# Patient Record
Sex: FEMALE | Race: BLACK OR AFRICAN AMERICAN | Employment: FULL TIME | URBAN - METROPOLITAN AREA
[De-identification: names, ages, dates, MRNs, and addresses within clinical notes are randomized per-mention and may not be internally consistent; named-entity substitution may affect disease eponyms.]

---

## 2018-12-06 ENCOUNTER — HOSPITAL ENCOUNTER (EMERGENCY)
Age: 43
Discharge: HOME | End: 2018-12-06
Attending: EMERGENCY MEDICINE
Payer: COMMERCIAL

## 2018-12-06 VITALS
HEART RATE: 80 BPM | DIASTOLIC BLOOD PRESSURE: 101 MMHG | TEMPERATURE: 97.5 F | OXYGEN SATURATION: 100 % | SYSTOLIC BLOOD PRESSURE: 152 MMHG | HEIGHT: 61 IN | RESPIRATION RATE: 18 BRPM | WEIGHT: 173.94 LBS | BODY MASS INDEX: 32.84 KG/M2

## 2018-12-06 DIAGNOSIS — H57.89 ITCHY, WATERY, AND RED EYE: ICD-10-CM

## 2018-12-06 DIAGNOSIS — R03.0 ELEVATED BLOOD PRESSURE READING: ICD-10-CM

## 2018-12-06 DIAGNOSIS — H10.13 ALLERGIC CONJUNCTIVITIS OF BOTH EYES: Primary | ICD-10-CM

## 2018-12-06 DIAGNOSIS — B30.9 ACUTE VIRAL CONJUNCTIVITIS OF BOTH EYES: ICD-10-CM

## 2018-12-06 PROCEDURE — 99282 EMERGENCY DEPT VISIT SF MDM: CPT

## 2018-12-06 RX ORDER — POLYMYXIN B SULFATE AND TRIMETHOPRIM 1; 10000 MG/ML; [USP'U]/ML
1 SOLUTION OPHTHALMIC EVERY 4 HOURS
Qty: 10 ML | Refills: 0 | Status: SHIPPED | OUTPATIENT
Start: 2018-12-06 | End: 2018-12-13

## 2018-12-06 NOTE — ED PROVIDER NOTES
EMERGENCY DEPARTMENT HISTORY AND PHYSICAL EXAM 
 
 
Date: 12/6/2018 Patient Name: Lazara Boyle History of Presenting Illness Chief Complaint Patient presents with  Red Eye  
  bilateral eye redness, pain and itchy after taking her cat to the vet. last dose of benadryl yesterday History Provided By: Patient HPI: Lazara Boyle, 37 y.o. female with PMHx significant for seizures, anxiety, presents ambulatory to the ED with cc of bilateral eye redness and itching that onset last night. She reports sneezing and sore throat. Pt endorses taking PO Benadryl and using eye drops with no alleviation of sxs. Pt notes that she took her cat to the vet yesterday but she is not allergic to cats. She reports she works for the [employer] but had the day off yesterday. She denies chance of foreign body hitting her eyes. She denies recent sick contact. Additionally, pt specifically denies any recent fever, chills, headache, nausea, vomiting, diarrhea, abdominal pain, CP, SOB, lightheadedness, dizziness, numbness, weakness, tingling, BLE swelling, heart palpitations, urinary sxs, changes in BM, changes in PO intake, melena, hematochezia, cough, or congestion. There are no other complaints, changes or physical findings at this time. Past History Past Medical History: 
Past Medical History:  
Diagnosis Date  Neurological disorder   
 seizures  Psychiatric disorder   
 anxiety  Stroke ([facility] [address] Past Surgical History: 
Past Surgical History:  
Procedure Laterality Date  ABDOMEN SURGERY PROC UNLISTED    
 fibroids  HX GYN    
 [id number] Family History: No family history on file. Social History: 
Social History Tobacco Use  Smoking status: Never Smoker  Smokeless tobacco: Never Used Substance Use Topics  Alcohol use: Yes Comment: socially  Drug use: No  
 
 
Allergies: 
No Known Allergies Review of Systems Review of Systems Constitutional: Negative. Negative for chills and fever. HENT: Positive for sneezing and sore throat. Negative for congestion, facial swelling, rhinorrhea, trouble swallowing and voice change. Eyes: Positive for redness (bilateral) and itching (bilateral). Respiratory: Negative. Negative for apnea, cough, chest tightness, shortness of breath and wheezing. Cardiovascular: Negative. Negative for chest pain, palpitations and leg swelling. Gastrointestinal: Negative. Negative for abdominal distention, abdominal pain, blood in stool, constipation, diarrhea, nausea and vomiting. Endocrine: Negative. Negative for cold intolerance, heat intolerance and polyuria. Genitourinary: Negative. Negative for difficulty urinating, dysuria, flank pain, frequency, hematuria and urgency. Musculoskeletal: Negative. Negative for arthralgias, back pain, myalgias, neck pain and neck stiffness. Skin: Negative. Negative for color change and rash. Neurological: Negative. Negative for dizziness, syncope, facial asymmetry, speech difficulty, weakness, light-headedness, numbness and headaches. Hematological: Negative. Does not bruise/bleed easily. Psychiatric/Behavioral: Negative. Negative for confusion and self-injury. The patient is not nervous/anxious. Physical Exam  
Physical Exam  
Constitutional: She is oriented to person, place, and time. She appears well-developed and well-nourished. No distress. HENT:  
Head: Normocephalic and atraumatic. Mouth/Throat: Oropharynx is clear and moist. No oropharyngeal exudate. Eyes: EOM are normal. Pupils are equal, round, and reactive to light. Right conjunctiva is injected. Left conjunctiva is injected. Neck: Normal range of motion. Cardiovascular: Normal rate, regular rhythm and normal heart sounds. Exam reveals no gallop and no friction rub. No murmur heard.  
Pulmonary/Chest: Effort normal and breath sounds normal. No respiratory distress. She has no wheezes. She has no rales. She exhibits no tenderness. Abdominal: Soft. Bowel sounds are normal. She exhibits no distension and no mass. There is no tenderness. There is no rebound and no guarding. Musculoskeletal: Normal range of motion. She exhibits no edema, tenderness or deformity. Neurological: She is alert and oriented to person, place, and time. She displays normal reflexes. No cranial nerve deficit. She exhibits normal muscle tone. Coordination normal.  
Skin: Skin is warm. No rash noted. She is not diaphoretic. Psychiatric: She has a normal mood and affect. Nursing note and vitals reviewed. Medical Decision Making I am the first provider for this patient. I reviewed the vital signs, available nursing notes, past medical history, past surgical history, family history and social history. Vital Signs-Reviewed the patient's vital signs. Patient Vitals for the past 24 hrs: 
 Temp Pulse Resp BP SpO2  
12/06/18 0836 97.5 °F (36.4 °C) 80 18 (!) 152/101 100 % Records Reviewed: Nursing Notes, Old Medical Records, Previous electrocardiograms, Previous Radiology Studies and Previous Laboratory Studies Provider Notes (Medical Decision Making): Pt presents to ED with acute eye redness and itching. Differential: corneal vs scleral abrasion, corneal ulcer; foreign body, conjunctivitis. No red flags for acute glaucoma or globe injury, retinal artery or vein occlusion. Will provide topical eye drops, advised contact precautions and referral to PCP. ED Course:  
Initial assessment performed. The patients presenting problems have been discussed, and they are in agreement with the care plan formulated and outlined with them. I have encouraged them to ask questions as they arise throughout their visit.  
 
I reviewed our electronic medical record system for any past medical records that were available that may contribute to the patient's current condition, the nursing notes and vital signs from today's visit. Neyda Barahona MD 
Progress Note: 
9:21 AM 
Patient has been reassessed and reports feeling better and symptoms have improved after ED treatment. Additionally, pt is able to tolerate PO and ambulate per baseline. Mauricio Merino final labs and imaging have been reviewed with her. She has been counseled regarding her diagnosis. She verbally conveys understanding and agreement of the signs, symptoms, diagnosis, treatment and prognosis and additionally agrees to follow up as recommended with Dr. Ananya Mancilla MD in 24 - 48 hours. She also agrees with the care-plan and conveys that all of her questions have been answered. I have also put together some discharge instructions for her that include: 1) educational information regarding their diagnosis, 2) how to care for their diagnosis at home, as well a 3) list of reasons why they would want to return to the ED prior to their follow-up appointment, should their condition change. I have answered all questions to patient's satisfaction. She both understood and agreed with plan as discussed above. Vital signs stable for discharge. Disposition: 
Discharge Note: 
9:22 AM 
The pt is ready for discharge. The pt's signs, symptoms, diagnosis, and discharge instructions have been discussed and pt has conveyed their understanding. The pt is to follow up as recommended or return to ER should their symptoms worsen. Plan has been discussed and pt is in agreement. PLAN: 
1. Current Discharge Medication List  
  
START taking these medications Details  
trimethoprim-polymyxin b (POLYTRIM) ophthalmic solution Administer 1 Drop to both eyes every four (4) hours for 7 days. Qty: 10 mL, Refills: 0  
  
cetirizine (ZYRTEC) 10 mg TbDi Take 1 Tab by mouth daily. Qty: 20 Tab, Refills: 0  
  
  
 
2. Follow-up Information Follow up With Specialties Details Why Contact Info Chasity Damico MD Family Practice   [address] 
[phone] [facility] EMERGENCY DEPT Emergency Medicine  As needed, If symptoms worsen [address] [facility] 
[phone] Return to ED if worse Diagnosis Clinical Impression: 1. Allergic conjunctivitis of both eyes 2. Acute viral conjunctivitis of both eyes 3. Itchy, watery, and red eye 4. Elevated blood pressure reading Attestations This note is prepared by Seun Velasquez, acting as Scribe for MD Evita Tripp MD : The scribe's documentation has been prepared under my direction and personally reviewed by me in its entirety. I confirm that the note above accurately reflects all work, treatment, procedures, and medical decision making performed by me. 
 
: 
This note will not be viewable in [address] Leanna Wray

## 2018-12-06 NOTE — LETTER
[facility] EMER[facility]ENCY [facility] 
[address] [id number] 
[phone] Work/School Note Date: 12/6/2018 To Whom It May concern: 
 
Jessa Mane was seen and treated today in the emergency room by the following provider(s): 
Attending Provider: Santos Malin MD.   
 
Jessa Mane may return to work on 12/7/18. Sincerely, Wing Escobar MD

## 2018-12-06 NOTE — DISCHARGE INSTRUCTIONS
Thank you for allowing us to take care of you today! We hope we addressed all of your concerns and needs. We strive to provide excellent quality care in the Emergency Department. You will receive a survey after your visit to evaluate the care you were provided. Should you receive a survey from us, we invite you to share your experience and tell us what made it excellent. It was a pleasure serving you, we invite you to share your experience with us, in our pursuit for excellence, should you be selected to receive a survey. The exam and treatment you received in the Emergency Department were for an urgent problem and are not intended as complete care. It is important that you follow up with a doctor, nurse practitioner, or physician assistant for ongoing care. If your symptoms become worse or you do not improve as expected and you are unable to reach your usual health care provider, you should return to the Emergency Department. We are available 24 hours a day. Please take your discharge instructions with you when you go to your follow-up appointment. If you have any problem arranging a follow-up appointment, contact the Emergency Department immediately. If a prescription has been provided, please have it filled as soon as possible to prevent a delay in treatment. Read the entire medication instruction sheet provided to you by the pharmacy. If you have any questions or reservations about taking the medication due to side effects or interactions with other medications, please call your primary care physician or contact the ER to speak with the charge nurse. Make an appointment with your family doctor or the physician you were referred to for follow-up of this visit as instructed on your discharge paperwork, as this is mandatory follow-up. Return to the ER if you are unable to be seen or if you are unable to be seen in a timely manner.     If you have any problem arranging the follow-up visit, contact the Emergency Department immediately. I hope you feel better and thank you again for allow us to provide you with excellent care today at [address]! Warmest regards,    Shivani Chinchilla MD  Emergency Medicine Physician  [address]                   Pinkeye: Care Instructions  Your Care Instructions    Pinkeye is redness and swelling of the eye surface and the conjunctiva (the lining of the eyelid and the covering of the white part of the eye). Pinkeye is also called conjunctivitis. Pinkeye is often caused by infection with bacteria or a virus. Dry air, allergies, smoke, and chemicals are other common causes. Pinkeye often clears on its own in 7 to 10 days. Antibiotics only help if the pinkeye is caused by bacteria. Pinkeye caused by infection spreads easily. If an allergy or chemical is causing pinkeye, it will not go away unless you can avoid whatever is causing it. Follow-up care is a key part of your treatment and safety. Be sure to make and go to all appointments, and call your doctor if you are having problems. It's also a good idea to know your test results and keep a list of the medicines you take. How can you care for yourself at home? · Wash your hands often. Always wash them before and after you treat pinkeye or touch your eyes or face. · Use moist cotton or a clean, wet cloth to remove crust. Wipe from the inside corner of the eye to the outside. Use a clean part of the cloth for each wipe. · Put cold or warm wet cloths on your eye a few times a day if the eye hurts. · Do not wear contact lenses or eye makeup until the pinkeye is gone. Throw away any eye makeup you were using when you got pinkeye. Clean your contacts and storage case. If you wear disposable contacts, use a new pair when your eye has cleared and it is safe to wear contacts again.   · If the doctor gave you antibiotic ointment or eyedrops, use them as directed. Use the medicine for as long as instructed, even if your eye starts looking better soon. Keep the bottle tip clean, and do not let it touch the eye area. · To put in eyedrops or ointment:  ? Tilt your head back, and pull your lower eyelid down with one finger. ? Drop or squirt the medicine inside the lower lid. ? Close your eye for 30 to 60 seconds to let the drops or ointment move around. ? Do not touch the ointment or dropper tip to your eyelashes or any other surface. · Do not share towels, pillows, or washcloths while you have pinkeye. When should you call for help? Call your doctor now or seek immediate medical care if:    · You have pain in your eye, not just irritation on the surface.     · You have a change in vision or loss of vision.     · You have an increase in discharge from the eye.     · Your eye has not started to improve or begins to get worse within 48 hours after you start using antibiotics.     · Pinkeye lasts longer than 7 days.    Watch closely for changes in your health, and be sure to contact your doctor if you have any problems. Where can you learn more? Go to http://shivani-chip.info/. Enter Y392 in the search box to learn more about \"Pinkeye: Care Instructions. \"  Current as of: November 20, 2017  Content Version: 11.8  © 2640-0063 [employer]. Care instructions adapted under license by [employer] (which disclaims liability or warranty for this information). If you have questions about a medical condition or this instruction, always ask your healthcare professional. [place] [address] any warranty or liability for your use of this information.

## 2021-05-17 ENCOUNTER — TELEPHONE (OUTPATIENT)
Dept: FAMILY MEDICINE CLINIC | Age: 46
End: 2021-05-17

## 2021-05-17 NOTE — TELEPHONE ENCOUNTER
----- Message from Ling Douglass sent at 5/17/2021  1:14 PM EDT -----  Regarding: [place]/Telephone  Contact: [phone]  Appointment not available    Caller's first and last name and relationship to patient (if not the patient): N/A      Best contact number:[phone] or [phone]      Preferred date and time: First available, anytime      Scheduled appointment date and time: August 5 at 8:30 am.      Reason for appointment: New patient appointment. Details to clarify the request: Patient would like to be seen sooner.         Ling Douglass

## 2021-05-18 NOTE — TELEPHONE ENCOUNTER
Verified patient with two type of identifiers. Informed pt at this time this would be the soonest with NP jeovanny but offered sooner appt with Dr. Bernadette Quispe. Pt states would like to cancel appt that was scheduled in August and will call back to reschedule.

## 2021-06-17 ENCOUNTER — OFFICE VISIT (OUTPATIENT)
Dept: NEUROLOGY | Age: 46
End: 2021-06-17
Payer: COMMERCIAL

## 2021-06-17 VITALS
SYSTOLIC BLOOD PRESSURE: 130 MMHG | BODY MASS INDEX: 32.8 KG/M2 | OXYGEN SATURATION: 98 % | DIASTOLIC BLOOD PRESSURE: 90 MMHG | WEIGHT: 173.6 LBS | TEMPERATURE: 98.8 F | RESPIRATION RATE: 18 BRPM | HEART RATE: 89 BPM

## 2021-06-17 DIAGNOSIS — G40.909 SEIZURE DISORDER (HCC): ICD-10-CM

## 2021-06-17 DIAGNOSIS — I67.82 CEREBRAL ISCHEMIA: Primary | ICD-10-CM

## 2021-06-17 DIAGNOSIS — G43.009 MIGRAINE WITHOUT AURA AND WITHOUT STATUS MIGRAINOSUS, NOT INTRACTABLE: ICD-10-CM

## 2021-06-17 PROCEDURE — 99205 OFFICE O/P NEW HI 60 MIN: CPT | Performed by: PSYCHIATRY & NEUROLOGY

## 2021-06-17 RX ORDER — CALCIUM CARBONATE/VITAMIN D3 600 MG-125
1 TABLET ORAL DAILY
COMMUNITY

## 2021-06-17 RX ORDER — GUAIFENESIN 100 MG/5ML
81 LIQUID (ML) ORAL DAILY
COMMUNITY

## 2021-06-17 RX ORDER — LANOLIN ALCOHOL/MO/W.PET/CERES
325 CREAM (GRAM) TOPICAL EVERY OTHER DAY
COMMUNITY

## 2021-06-17 RX ORDER — BISMUTH SUBSALICYLATE 262 MG
1 TABLET,CHEWABLE ORAL DAILY
COMMUNITY

## 2021-06-17 NOTE — PROGRESS NOTES
Neurology Consult Note      HISTORY PROVIDED BY: patient    Chief Complaint:   Chief Complaint   Patient presents with    New Patient      Subjective:    Maria De Jesus Harrison is a 39 y.o. right handed female who presents in consultation for seizure and headache. This is a 42-year-old right-handed female with history of seizure disorder, major depressive disorder, cerebrovascular accident, who was referred to the clinic to establish neurological care and management. Patient says she was told that she has stroke in October 2001, patient was then managed, subsequently in June 2002 patient had seizure activity. Since then, patient says she has had couple of seizures, seizure would come without warning and she would lose consciousness. Patient last seizure however was 15 years ago. Patient says she currently having headaches, headache frequency is variable 1-2 times a week. Headache is throbbing in nature mostly frontal, occasional sharp pain come from the back of the head. Headache associated with dizziness, blurry vision, occasional nausea, photophobia, phonophobia. There is no established trigger, however, stress and lack of sleep make the headache worse. Patient says sometimes she has to go to a dark and quiet room to make the headache better. Sometimes, patient says she uses over-the-counter medication.   Review of Systems - General ROS: positive for  - fatigue and sleep disturbance  Psychological ROS: positive for - anxiety, depression and sleep disturbances  Ophthalmic ROS: positive for - blurry vision, decreased vision and photophobia  ENT ROS: positive for - headaches, tinnitus and vertigo  Allergy and Immunology ROS: negative  Hematological and Lymphatic ROS: negative  Endocrine ROS: negative  Respiratory ROS: no cough, shortness of breath, or wheezing  Cardiovascular ROS: no chest pain or dyspnea on exertion  Gastrointestinal ROS: no abdominal pain, change in bowel habits, or black or bloody stools  Genito-Urinary ROS: no dysuria, trouble voiding, or hematuria  Musculoskeletal ROS: positive for - joint pain, muscle pain and muscular weakness  Neurological ROS: positive for - dizziness, headaches, impaired coordination/balance, numbness/tingling, seizures, visual changes and weakness  Dermatological ROS: negative    Past Medical History:   Diagnosis Date    Neurological disorder     seizures    Psychiatric disorder     anxiety    Stroke [facility])       Past Surgical History:   Procedure Laterality Date    ABDOMEN SURGERY PROC UNLISTED      fibroids    HX GYN      [id number]      Social History     Socioeconomic History    Marital status: SINGLE     Spouse name: Not on file    Number of children: Not on file    Years of education: Not on file    Highest education level: Not on file   Occupational History    Not on file   Tobacco Use    Smoking status: Never Smoker    Smokeless tobacco: Never Used   Substance and Sexual Activity    Alcohol use: Yes     Comment: socially    Drug use: No    Sexual activity: Yes     Birth control/protection: Implant   Other Topics Concern    Not on file   Social History Narrative    Not on file     Social Determinants of Health     Financial Resource Strain:     Difficulty of Paying Living Expenses:    Food Insecurity:     Worried About Running Out of Food in the Last Year:     Ran Out of Food in the Last Year:    Transportation Needs:     Lack of Transportation (Medical):      Lack of Transportation (Non-Medical):    Physical Activity:     Days of Exercise per Week:     Minutes of Exercise per Session:    Stress:     Feeling of Stress :    Social Connections:     Frequency of Communication with Friends and Family:     Frequency of Social Gatherings with Friends and Family:     Attends [employer] Services:     Active Member of Clubs or Organizations:     Attends Club or Organization Meetings:     Marital Status:    Intimate Partner Violence:     Fear of Current or Ex-Partner:     Emotionally Abused:     Physically Abused:     Sexually Abused:      No family history on file. Objective:   ROS  As per HPI    No Known Allergies     Meds:  Outpatient Medications Prior to Visit   Medication Sig Dispense Refill    aspirin 81 mg chewable tablet Take 81 mg by mouth daily.  multivitamin (ONE A DAY) tablet Take 1 Tablet by mouth daily.  calcium-cholecalciferol, d3, (CALCIUM 600 + D) 600-125 mg-unit tab Take 1 Capsule by mouth daily.  ferrous sulfate (Iron) 325 mg (65 mg iron) tablet Take 325 mg by mouth every other day.  cetirizine (ZYRTEC) 10 mg TbDi Take 1 Tab by mouth daily. 20 Tab 0    phenytoin ER (DILANTIN EXTENDED) 100 mg ER capsule Take 500 mg by mouth daily. No facility-administered medications prior to visit. Imaging:  MRI Results (most recent):  No results found for this or any previous visit. CT Results (most recent):  Results from Hospital Encounter encounter on 01/05/13    CT HEAD WO CONT    Narrative  **Final Report**      ICD Codes / Adm. Diagnosis: 52   / Headache  Examination:  CT HEAD WO CON  - [id number] - Jan 5 2013  1:32PM  Accession No:  [id number]  Reason:  headache      REPORT:  INDICATION:   headache    EXAM:  HEAD CT WITHOUT CONTRAST    COMPARISON:  None    TECHNIQUE:  Routine noncontrast axial head CT was performed. FINDINGS:    The ventricles are midline without hydrocephalus. There is no acute intra  or extra-axial hemorrhage. There is no acute infarct. There is no mass on  this unenhanced examination. There is encephalomalacia in the right middle  cerebral artery territory. The basal cisterns are patent. The paranasal  sinuses are clear. IMPRESSION:    Remote right MCA territory infarction. No acute abnormality.               Signing/Reading Doctor: Kalin Lacey ([id number])  Approved: Kalin Lacey ([id number])  Jan 5 2013  1:54PM       Reviewed records in [employer] and [facility] tab today    Lab Review   Results for orders placed or performed in visit on 06/17/21   VITAMIN B12   Result Value Ref Range    Vitamin B12 338 232 - 1,245 pg/mL   SPEP AND ROSLYN, SERUM   Result Value Ref Range    Immunoglobulin G, Qt. 1,209 586 - 1,602 mg/dL    Immunoglobulin A, Qt. 253 87 - 352 mg/dL    Immunoglobulin M, Qt. 145 26 - 217 mg/dL    Protein, total 7.5 6.0 - 8.5 g/dL    Albumin 4.0 2.9 - 4.4 g/dL    Alpha-1-Globulin 0.3 0.0 - 0.4 g/dL    Alpha-2-Globulin 0.9 0.4 - 1.0 g/dL    Beta Globulin 1.2 0.7 - 1.3 g/dL    Gamma Globulin 1.2 0.4 - 1.8 g/dL    M-Rolan Not Observed Not Observed g/dL    Globulin, total 3.5 2.2 - 3.9 g/dL    A/G ratio 1.2 0.7 - 1.7    Immunofixation Result Comment     Please note Comment    SED RATE (ESR)   Result Value Ref Range    Sed rate (ESR) 35 (H) 0 - 32 mm/hr   CRP, HIGH SENSITIVITY   Result Value Ref Range    C-Reactive Protein, Cardiac 8.18 (H) 0.00 - 3.00 mg/L   CK   Result Value Ref Range    Creatine Kinase,Total 74 32 - 182 U/L   ALDOLASE   Result Value Ref Range    Aldolase 3.6 3.3 - 10.3 U/L   PROTEIN ELECTROPHORESIS   Result Value Ref Range    Albumin CANCELED g/dL    Alpha-1-globulin CANCELED g/dL    ALPHA-2 GLOBULIN CANCELED g/dL    Beta globulin CANCELED g/dL    Gamma globulin CANCELED g/dL    M-Rolan CANCELED g/dL    Globulin, total CANCELED g/dL    A/G ratio CANCELED     Please note Comment    PROTEIN C ACTIVITY   Result Value Ref Range    Protein C-Functional 121 73 - 180 %        Exam:  Visit Vitals  BP (!) 130/90   Pulse 89   Temp 98.8 °F (37.1 °C)   Resp 18   Wt 173 lb 9.6 oz (78.7 kg)   SpO2 98%   BMI 32.80 kg/m²     General:  Alert, cooperative, no distress. Head:  Normocephalic, without obvious abnormality, atraumatic. Respiratory:  Heart:   Non labored breathing  Regular rate and rhythm, no murmurs   Neck:   2+ carotids, no bruits   Extremities: Warm, no cyanosis or edema. Pulses: 2+ radial pulses.        Neurologic:  MS: Alert and oriented x 4, speech intact. Language intact, able to name, repeat, and follow all commands. Attention and fund of knowledge appropriate. Recent and remote memory intact. Cranial Nerves:  II: visual fields Full to confrontation   II: pupils Equal, round, reactive to light   II: optic disc No papilledema   III,VII: ptosis none   III,IV,VI: extraocular muscles  EOMI, no nystagmus or diplopia   V: facial light touch sensation  normal   VII: facial muscle function   symmetric   VIII: hearing intact   IX: soft palate elevation  normal   XI: trapezius strength  5/5   XI: sternocleidomastoid strength 5/5   XII: tongue  Midline     Motor: normal bulk and tone, no tremor              Strength: 5-/5 throughout, no PD  Sensory: Equivocal sensation to LT, PP, temperature and vibration  Coordination: FTN and HTS intact, MICHAEL intact,Romberg negative  Gait: normal gait, unable to heel, toe, and tandem walk  Reflexes: 3+ symmetric, toes downgoing           Assessment/Plan       ICD-10-CM ICD-9-CM    1. Cerebral ischemia  I67.82 437.1 MRI BRAIN W WO CONT      VITAMIN B12      SPEP AND ROSLYN, SERUM      SED RATE (ESR)      JULISA, DIRECT, W/REFLEX      CRP, HIGH SENSITIVITY      CK      ALDOLASE      PROTEIN ELECTROPHORESIS      PROTEIN C ACTIVITY      PROTEIN S ANTIGEN      VITAMIN B12      SPEP AND ROSLYN, SERUM      SED RATE (ESR)      CRP, HIGH SENSITIVITY      CK      ALDOLASE      PROTEIN ELECTROPHORESIS      PROTEIN C ACTIVITY      CANCELED: VITAMIN B6      CANCELED: VITAMIN B6   2. Seizure disorder ([facility] [address]  G40.909 345.90 MRI BRAIN W WO CONT      EEG      SPEP AND ROSLYN, SERUM      SED RATE (ESR)      JULISA, DIRECT, W/REFLEX      CRP, HIGH SENSITIVITY      CK      ALDOLASE      PROTEIN ELECTROPHORESIS      PROTEIN S ANTIGEN      SPEP AND ROSLYN, SERUM      SED RATE (ESR)      CRP, HIGH SENSITIVITY      CK      ALDOLASE      PROTEIN ELECTROPHORESIS      CANCELED: VITAMIN B6      CANCELED: VITAMIN B6   3.  Chronic migraine  G43.279 346.70 MRI BRAIN W WO CONT VITAMIN B12      SPEP AND ROSLYN, SERUM      JULISA, DIRECT, W/REFLEX      VITAMIN B12      SPEP AND ROSLYN, SERUM   4. Migraine without aura and without status migrainosus, not intractable  G43.009 346.10        Follow-up and Dispositions    · Return in about 2 months (around 8/17/2021). Plan:  I will start patient on aspirin 81 mg p.o. daily  Due to the fact the patient had a seizure and headache I will start her on Trokendi  mg p.o. nightly, patient has been tried on Topamax which caused. Numbness and tingling. MRI of the brain with and without gadolinium to evaluate for intracranial lesion  EEG  Blood for autoimmune work-up, CK, aldolase, protein C, protein S, homocystine, vitamin B12, vitamin D6, vitamin D, protein serum electrophoresis, CRP. Thank you very much for this consultation.          Signed:  Sayra Cat MD  6/17/2021

## 2021-06-24 LAB
ALBUMIN SERPL ELPH-MCNC: 4 G/DL (ref 2.9–4.4)
ALBUMIN SERPL ELPH-MCNC: NORMAL G/DL
ALBUMIN/GLOB SERPL: 1.2 {RATIO} (ref 0.7–1.7)
ALBUMIN/GLOB SERPL: NORMAL {RATIO}
ALDOLASE SERPL-CCNC: 3.6 U/L (ref 3.3–10.3)
ALPHA1 GLOB SERPL ELPH-MCNC: 0.3 G/DL (ref 0–0.4)
ALPHA1 GLOB SERPL ELPH-MCNC: NORMAL G/DL
ALPHA2 GLOB SERPL ELPH-MCNC: 0.9 G/DL (ref 0.4–1)
ALPHA2 GLOB SERPL ELPH-MCNC: NORMAL G/DL
B-GLOBULIN SERPL ELPH-MCNC: 1.2 G/DL (ref 0.7–1.3)
B-GLOBULIN SERPL ELPH-MCNC: NORMAL G/DL
CK SERPL-CCNC: 74 U/L (ref 32–182)
CRP SERPL HS-MCNC: 8.18 MG/L (ref 0–3)
ERYTHROCYTE [SEDIMENTATION RATE] IN BLOOD BY WESTERGREN METHOD: 35 MM/HR (ref 0–32)
GAMMA GLOB SERPL ELPH-MCNC: 1.2 G/DL (ref 0.4–1.8)
GAMMA GLOB SERPL ELPH-MCNC: NORMAL G/DL
GLOBULIN SER CALC-MCNC: NORMAL G/DL
GLOBULIN SER-MCNC: 3.5 G/DL (ref 2.2–3.9)
IGA SERPL-MCNC: 253 MG/DL (ref 87–352)
IGG SERPL-MCNC: 1209 MG/DL (ref 586–1602)
IGM SERPL-MCNC: 145 MG/DL (ref 26–217)
INTERPRETATION SERPL IEP-IMP: NORMAL
M PROTEIN SERPL ELPH-MCNC: NORMAL G/DL
M PROTEIN SERPL ELPH-MCNC: NORMAL G/DL
PLEASE NOTE, 011150: NORMAL
PLEASE NOTE:, 149534: NORMAL
PROT SERPL-MCNC: 7.5 G/DL (ref 6–8.5)
VIT B12 SERPL-MCNC: 338 PG/ML (ref 232–1245)

## 2021-06-25 LAB — PROT C ACT/NOR PPP: 121 % (ref 73–180)

## 2021-07-07 ENCOUNTER — HOSPITAL ENCOUNTER (OUTPATIENT)
Dept: MRI IMAGING | Age: 46
Discharge: HOME | End: 2021-07-07
Attending: PSYCHIATRY & NEUROLOGY
Payer: COMMERCIAL

## 2021-07-07 DIAGNOSIS — I67.82 CEREBRAL ISCHEMIA: ICD-10-CM

## 2021-07-07 DIAGNOSIS — G40.909 SEIZURE DISORDER (HCC): ICD-10-CM

## 2021-07-07 PROCEDURE — 70553 MRI BRAIN STEM W/O & W/DYE: CPT

## 2021-07-07 PROCEDURE — 74011636320 HC RX REV CODE- 636/320: Performed by: PSYCHIATRY & NEUROLOGY

## 2021-07-07 PROCEDURE — A9576 INJ PROHANCE MULTIPACK: HCPCS | Performed by: PSYCHIATRY & NEUROLOGY

## 2021-07-07 RX ADMIN — GADOTERIDOL 15 ML: 279.3 INJECTION, SOLUTION INTRAVENOUS at 17:21

## 2021-08-05 ENCOUNTER — HOSPITAL ENCOUNTER (OUTPATIENT)
Dept: NEUROLOGY | Age: 46
Discharge: HOME | End: 2021-08-05
Attending: PSYCHIATRY & NEUROLOGY
Payer: COMMERCIAL

## 2021-08-05 DIAGNOSIS — G40.909 SEIZURE DISORDER (HCC): ICD-10-CM

## 2021-08-05 PROCEDURE — 95816 EEG AWAKE AND DROWSY: CPT

## 2021-08-15 RX ORDER — TOPIRAMATE 100 MG/1
CAPSULE, EXTENDED RELEASE ORAL
Qty: 30 CAPSULE | Refills: 1 | Status: SHIPPED | OUTPATIENT
Start: 2021-08-15 | End: 2021-10-25 | Stop reason: SDUPTHER

## 2021-11-10 ENCOUNTER — OFFICE VISIT (OUTPATIENT)
Dept: NEUROLOGY | Age: 46
End: 2021-11-10
Payer: COMMERCIAL

## 2021-11-10 VITALS
HEART RATE: 79 BPM | RESPIRATION RATE: 18 BRPM | SYSTOLIC BLOOD PRESSURE: 140 MMHG | DIASTOLIC BLOOD PRESSURE: 90 MMHG | OXYGEN SATURATION: 98 % | WEIGHT: 173 LBS | HEIGHT: 61 IN | BODY MASS INDEX: 32.66 KG/M2 | TEMPERATURE: 98.2 F

## 2021-11-10 DIAGNOSIS — G40.909 SEIZURE DISORDER (HCC): ICD-10-CM

## 2021-11-10 DIAGNOSIS — G43.009 MIGRAINE WITHOUT AURA AND WITHOUT STATUS MIGRAINOSUS, NOT INTRACTABLE: ICD-10-CM

## 2021-11-10 DIAGNOSIS — I67.82 CEREBRAL ISCHEMIA: Primary | ICD-10-CM

## 2021-11-10 PROCEDURE — 99214 OFFICE O/P EST MOD 30 MIN: CPT | Performed by: PSYCHIATRY & NEUROLOGY

## 2021-11-10 NOTE — PROGRESS NOTES
Chief Complaint   Patient presents with    Follow-up     Concerned with medication cost Trokendi XR.

## 2021-11-10 NOTE — PROGRESS NOTES
Neurology Progress Note    NAME:  Mckayla Kelly   :   1975   MRN:   [id number]     Date: 11/10/2021  Subjective:      Mckayla Kelly is a 55 y.o. female here today for follow-up for seizures, headache and CVA, test results  Patient says she has not had any seizures since the last visit  She says however she has been having headaches, headache has been somewhat persistent, more frequent. Headache is throbbing in nature mostly frontal, associated with nausea, photophobia, dizziness blurry vision. No specific trigger. Patient has been tried on the following medication, propranolol, Elavil, currently on Topamax for both seizures and headache but headache has not improved much. Because of patient's comorbid condition of CVA, patient will benefit from CGRP. I will start patient on Emgality and Nurtec. EEG reviewed with patient was unremarkable for seizure activity. MRI of the brain significant for chronic right MCA infarct. Blood work was appropriate slight elevation of C-reactive protein and ESR.   eview of Systems - General ROS: positive for  - fatigue and sleep disturbance  Psychological ROS: positive for - anxiety, depression and sleep disturbances  Ophthalmic ROS: positive for - blurry vision, decreased vision and photophobia  ENT ROS: positive for - headaches, tinnitus and vertigo  Allergy and Immunology ROS: negative  Hematological and Lymphatic ROS: negative  Endocrine ROS: negative  Respiratory ROS: no cough, shortness of breath, or wheezing  Cardiovascular ROS: no chest pain or dyspnea on exertion  Gastrointestinal ROS: no abdominal pain, change in bowel habits, or black or bloody stools  Genito-Urinary ROS: no dysuria, trouble voiding, or hematuria  Musculoskeletal ROS: positive for - joint pain, muscle pain and muscular weakness  Neurological ROS: positive for - dizziness, headaches, impaired coordination/balance, numbness/tingling, seizures, visual changes and weakness  Dermatological ROS: negative    Medications reviewed:  Current Outpatient Medications   Medication Sig Dispense Refill    topiramate ER (Trokendi XR) 100 mg capsule Take 1 Capsule by mouth daily. 30 Capsule 1    aspirin 81 mg chewable tablet Take 81 mg by mouth daily.  multivitamin (ONE A DAY) tablet Take 1 Tablet by mouth daily.  calcium-cholecalciferol, d3, (CALCIUM 600 + D) 600-125 mg-unit tab Take 1 Capsule by mouth daily.  ferrous sulfate (Iron) 325 mg (65 mg iron) tablet Take 325 mg by mouth every other day.  cetirizine (ZYRTEC) 10 mg TbDi Take 1 Tab by mouth daily.  20 Tab 0        Objective:   Vitals:  Vitals:    11/10/21 1540   BP: (!) 140/90   Pulse: 79   Resp: 18   Temp: 98.2 °F (36.8 °C)   TempSrc: Temporal   SpO2: 98%   Weight: 173 lb (78.5 kg)   Height: 5' 1\" (1.549 m)   PainSc:   2   PainLoc: Head   LMP: 08/21/2015               Lab Data Reviewed:  Lab Results   Component Value Date/Time    WBC 5.6 08/30/2015 02:24 PM    HCT 31.4 (L) 08/30/2015 02:24 PM    HGB 9.4 (L) 08/30/2015 02:24 PM    PLATELET 542 15/61/9741 02:24 PM       Lab Results   Component Value Date/Time    Sodium 136 08/30/2015 02:24 PM    Potassium 3.5 08/30/2015 02:24 PM    Chloride 104 08/30/2015 02:24 PM    CO2 25 08/30/2015 02:24 PM    Glucose 77 08/30/2015 02:24 PM    BUN 6 08/30/2015 02:24 PM    Creatinine 0.44 (L) 08/30/2015 02:24 PM    Calcium 8.4 (L) 08/30/2015 02:24 PM       No components found for: TROPQUANT    No results found for: JULISA      No results found for: HBA1C, VZQ0HEAK, GCA9WVRL     Lab Results   Component Value Date/Time    Vitamin B12 338 06/18/2021 12:00 AM       No results found for: JULISA, ANARX, ANAIGG, XBANA    No results found for: CHOL, CHOLPOCT, CHOLX, CHLST, CHOLV, HDL, HDLPOC, HDLP, LDL, LDLCPOC, LDLC, DLDLP, VLDLC, VLDL, TGLX, TRIGL, TRIGP, TGLPOCT, CHHD, CHHDX      CT Results (recent):  Results from [place] encounter on 01/05/13    CT HEAD WO CONT    Narrative  **Final Report**      ICD Codes / Adm. Diagnosis: 52   / Headache  Examination:  CT HEAD WO CON  - [id number] - Jan 5 2013  1:32PM  Accession No:  [id number]  Reason:  headache      REPORT:  INDICATION:   headache    EXAM:  HEAD CT WITHOUT CONTRAST    COMPARISON:  None    TECHNIQUE:  Routine noncontrast axial head CT was performed. FINDINGS:    The ventricles are midline without hydrocephalus. There is no acute intra  or extra-axial hemorrhage. There is no acute infarct. There is no mass on  this unenhanced examination. There is encephalomalacia in the right middle  cerebral artery territory. The basal cisterns are patent. The paranasal  sinuses are clear. IMPRESSION:    Remote right MCA territory infarction. No acute abnormality. Signing/Reading Doctor: Gary Tuttle ([id number])  Approved: Gary Tuttle ([id number])  Jan 5 2013  1:54PM      MRI Results (recent):  Results from Hospital Encounter encounter on 07/07/21    MRI BRAIN W WO CONT    Narrative  EXAM:  MRI BRAIN W WO CONT    INDICATION:    Chronic migraine    COMPARISON:  None. CONTRAST: 15 ml Dotarem. TECHNIQUE:  Multiplanar multisequence acquisition without and with contrast of the brain. FINDINGS:  Diffusion imaging does not show any acute ischemic changes. There is a remote infarct in the vascular distribution of the right middle  cerebral artery. There is no extra-axial fluid collection hemorrhage or shift. There is no significant white matter disease. Flow voids in major vessels at the base of the brain are present. There is no mass. No enhancing lesion. Impression  1. No acute findings no mass. 2. Chronic Encephalomalacia right middle cerebral artery distribution. IR Results (recent):  No results found for this or any previous visit. VAS/US Results (recent):  No results found for this or any previous visit. PHYSICAL EXAM:  General:    Alert, cooperative, no distress, appears stated age.      Head:   Normocephalic, without obvious abnormality, atraumatic. Eyes:   Conjunctivae/corneas clear. PERRLA  Nose:  Nares normal. No drainage or sinus tenderness. Throat:    Lips, mucosa, and tongue normal.  No Thrush  Neck:  Supple, symmetrical,  no adenopathy, thyroid: non tender    no carotid bruit and no JVD. Back:    Symmetric,  No CVA tenderness. Lungs:   Clear to auscultation bilaterally. No Wheezing or Rhonchi. No rales. Chest wall:  No tenderness or deformity. No Accessory muscle use. Heart:   Regular rate and rhythm,  no murmur, rub or gallop. Abdomen:   Soft, non-tender. Not distended. Bowel sounds normal. No masses  Extremities: Extremities normal, atraumatic, No cyanosis. No edema. No clubbing  Skin:     Texture, turgor normal. No rashes or lesions. Not Jaundiced  Lymph nodes: Cervical, supraclavicular normal.  Psych:  Good insight. Not depressed. Not anxious or agitated. NEUROLOGICAL EXAM:  Appearance: The patient is well developed, well nourished, provides a coherent history and is in no acute distress. Mental Status: Oriented to time, place and person. Mood and affect appropriate. Cranial Nerves:   Intact visual fields. Fundi are benign. HILDA, EOM's full, no nystagmus, no ptosis. Facial sensation is normal. Corneal reflexes are intact. Facial movement is symmetric. Hearing is normal bilaterally. Palate is midline with normal sternocleidomastoid and trapezius muscles are normal. Tongue is midline. Motor:  5/5 strength in upper and lower proximal and distal muscles. Normal bulk and tone. No fasciculations. Reflexes:   Deep tendon reflexes 2+/4 and symmetrical.   Sensory:   Normal to touch, pinprick and vibration. Gait:  Normal gait. Tremor:   No tremor noted. Cerebellar:  No cerebellar signs present. Neurovascular:  Normal heart sounds and regular rhythm, peripheral pulses intact, and no carotid bruits. Assesment  1. Cerebral ischemia  Continue aspirin    2.  Migraine without aura and without status migrainosus, not intractable  Emgality  Nurtec    3. Seizure disorder ([address]  Trokendi  mg p.o. nightly    ___________________________________________________  PLAN: Medication and plan discussed with patient      ICD-10-CM ICD-9-CM    1. Cerebral ischemia  I67.82 437.1    2. Migraine without aura and without status migrainosus, not intractable  G43.009 346.10    3. Seizure disorder ([address]  G40.909 345.90      Follow-up and Dispositions    · Return in about 6 months (around 5/10/2022).                  ___________________________________________________    Attending Physician: Willi Allen MD

## 2022-03-01 RX ORDER — TOPIRAMATE 100 MG/1
CAPSULE, EXTENDED RELEASE ORAL
Qty: 30 CAPSULE | Refills: 1 | Status: SHIPPED | OUTPATIENT
Start: 2022-03-01 | End: 2022-05-08

## 2022-05-08 RX ORDER — TOPIRAMATE 100 MG/1
CAPSULE, EXTENDED RELEASE ORAL
Qty: 30 CAPSULE | Refills: 1 | Status: SHIPPED | OUTPATIENT
Start: 2022-05-08 | End: 2022-06-01 | Stop reason: SDUPTHER

## 2022-09-27 ENCOUNTER — OFFICE VISIT (OUTPATIENT)
Dept: NEUROLOGY | Age: 47
End: 2022-09-27
Payer: COMMERCIAL

## 2022-09-27 VITALS
DIASTOLIC BLOOD PRESSURE: 74 MMHG | WEIGHT: 168 LBS | SYSTOLIC BLOOD PRESSURE: 129 MMHG | RESPIRATION RATE: 17 BRPM | BODY MASS INDEX: 31.72 KG/M2 | HEART RATE: 70 BPM | HEIGHT: 61 IN | TEMPERATURE: 98.1 F

## 2022-09-27 DIAGNOSIS — I67.82 CEREBRAL ISCHEMIA: Primary | ICD-10-CM

## 2022-09-27 DIAGNOSIS — G43.009 MIGRAINE WITHOUT AURA AND WITHOUT STATUS MIGRAINOSUS, NOT INTRACTABLE: ICD-10-CM

## 2022-09-27 DIAGNOSIS — G40.909 SEIZURE DISORDER (HCC): ICD-10-CM

## 2022-09-27 PROCEDURE — 99214 OFFICE O/P EST MOD 30 MIN: CPT | Performed by: PSYCHIATRY & NEUROLOGY

## 2022-09-27 RX ORDER — SERTRALINE HYDROCHLORIDE 100 MG/1
100 TABLET, FILM COATED ORAL DAILY
COMMUNITY

## 2022-09-27 NOTE — PROGRESS NOTES
Chief Complaint   Patient presents with    Follow-up    Migraine    1. Have you been to the ER, urgent care clinic since your last visit? No  Hospitalized since your last visit? no    2. Have you seen or consulted any other health care providers outside of the [address] [place] [address] since your last visit? Include any pap smears or colon screening.

## 2022-09-27 NOTE — PROGRESS NOTES
Neurology Progress Note    NAME:  Deanna Sabillon   :   1975   MRN:   [id number]     Date/Time:  2022  Subjective:   Deanna Sabillon is a 55 y.o. female here today for follow-up for seizures, headache and CVA  Patient reports no seizure since last visit  Headache has improved according to patient, Topamax has been helping both seizure and headache. Headache mostly triggered by stress. Headache is throbbing in nature mostly frontal, associated with nausea, photophobia, dizziness blurry vision. Review of Systems - General ROS: positive for  - fatigue and sleep disturbance  Psychological ROS: positive for - anxiety, depression and sleep disturbances  Ophthalmic ROS: positive for - blurry vision, decreased vision and photophobia  ENT ROS: positive for - headaches, tinnitus and vertigo  Allergy and Immunology ROS: negative  Hematological and Lymphatic ROS: negative  Endocrine ROS: negative  Respiratory ROS: no cough, shortness of breath, or wheezing  Cardiovascular ROS: no chest pain or dyspnea on exertion  Gastrointestinal ROS: no abdominal pain, change in bowel habits, or black or bloody stools  Genito-Urinary ROS: no dysuria, trouble voiding, or hematuria  Musculoskeletal ROS: positive for - joint pain, muscle pain and muscular weakness  Neurological ROS: positive for - dizziness, headaches, impaired coordination/balance, numbness/tingling, seizures, visual changes and weakness  Dermatological ROS: negative           Medications reviewed:  Current Outpatient Medications   Medication Sig Dispense Refill    sertraline (ZOLOFT) 100 mg tablet Take 100 mg by mouth daily. topiramate ER (Trokendi XR) 100 mg capsule Take 1 Capsule by mouth daily. 30 Capsule 3    aspirin 81 mg chewable tablet Take 81 mg by mouth daily. multivitamin (ONE A DAY) tablet Take 1 Tablet by mouth daily. calcium-cholecalciferol, d3, (CALCIUM 600 + D) 600-125 mg-unit tab Take 1 Capsule by mouth daily.       ferrous sulfate 325 mg (65 mg iron) tablet Take 325 mg by mouth every other day. cetirizine (ZYRTEC) 10 mg TbDi Take 1 Tab by mouth daily. 20 Tab 0        Objective:   Vitals:  Vitals:    09/27/22 1528   BP: 129/74   Pulse: 70   Resp: 17   Temp: 98.1 °F (36.7 °C)   TempSrc: Temporal   Weight: 168 lb (76.2 kg)   Height: 5' 1\" (1.549 m)   PainSc:   7   LMP: 08/21/2015               Lab Data Reviewed:  Lab Results   Component Value Date/Time    WBC 5.6 08/30/2015 02:24 PM    HCT 31.4 (L) 08/30/2015 02:24 PM    HGB 9.4 (L) 08/30/2015 02:24 PM    PLATELET 554 37/47/4190 02:24 PM       Lab Results   Component Value Date/Time    Sodium 136 08/30/2015 02:24 PM    Potassium 3.5 08/30/2015 02:24 PM    Chloride 104 08/30/2015 02:24 PM    CO2 25 08/30/2015 02:24 PM    Glucose 77 08/30/2015 02:24 PM    BUN 6 08/30/2015 02:24 PM    Creatinine 0.44 (L) 08/30/2015 02:24 PM    Calcium 8.4 (L) 08/30/2015 02:24 PM       No components found for: TROPQUANT    No results found for: JULISA      No results found for: HBA1C, FSL1CHDL, JIV2KHKL     Lab Results   Component Value Date/Time    Vitamin B12 338 06/18/2021 12:00 AM       No results found for: JULISA, ANARX, ANAIGG, XBANA    No results found for: CHOL, CHOLPOCT, CHOLX, CHLST, CHOLV, HDL, HDLPOC, HDLP, LDL, LDLCPOC, LDLC, DLDLP, VLDLC, VLDL, TGLX, TRIGL, TRIGP, TGLPOCT, CHHD, CHHDX      CT Results (recent):  Results from Hospital Encounter encounter on 01/05/13    CT HEAD WO CONT    Narrative  **Final Report**      ICD Codes / Adm. Diagnosis: 52   / Headache  Examination:  CT HEAD WO CON  - [id number] - Jan 5 2013  1:32PM  Accession No:  [id number]  Reason:  headache      REPORT:  INDICATION:   headache    EXAM:  HEAD CT WITHOUT CONTRAST    COMPARISON:  None    TECHNIQUE:  Routine noncontrast axial head CT was performed. FINDINGS:    The ventricles are midline without hydrocephalus. There is no acute intra  or extra-axial hemorrhage. There is no acute infarct.   There is no mass on  this unenhanced examination. There is encephalomalacia in the right middle  cerebral artery territory. The basal cisterns are patent. The paranasal  sinuses are clear. IMPRESSION:    Remote right MCA territory infarction. No acute abnormality. Signing/Reading Doctor: Yoel Link ([id number])  Approved: Yoel Link ([id number])  Jan 5 2013  1:54PM      MRI Results (recent):  Results from Hospital Encounter encounter on 07/07/21    MRI BRAIN W WO CONT    Narrative  EXAM:  MRI BRAIN W WO CONT    INDICATION:    Chronic migraine    COMPARISON:  None. CONTRAST: 15 ml Dotarem. TECHNIQUE:  Multiplanar multisequence acquisition without and with contrast of the brain. FINDINGS:  Diffusion imaging does not show any acute ischemic changes. There is a remote infarct in the vascular distribution of the right middle  cerebral artery. There is no extra-axial fluid collection hemorrhage or shift. There is no significant white matter disease. Flow voids in major vessels at the base of the brain are present. There is no mass. No enhancing lesion. Impression  1. No acute findings no mass. 2. Chronic Encephalomalacia right middle cerebral artery distribution. IR Results (recent):  No results found for this or any previous visit. VAS/US Results (recent):  No results found for this or any previous visit. PHYSICAL EXAM:  General:    Alert, cooperative, no distress, appears stated age. Head:   Normocephalic, without obvious abnormality, atraumatic. Eyes:   Conjunctivae/corneas clear. PERRLA  Nose:  Nares normal. No drainage or sinus tenderness. Throat:    Lips, mucosa, and tongue normal.  No Thrush  Neck:  Supple, symmetrical,  no adenopathy, thyroid: non tender    no carotid bruit and no JVD. Back:    Symmetric,  No CVA tenderness. Lungs:   Clear to auscultation bilaterally. No Wheezing or Rhonchi. No rales. Chest wall:  No tenderness or deformity. No Accessory muscle use.   Heart:   Regular rate and rhythm,  no murmur, rub or gallop. Abdomen:   Soft, non-tender. Not distended. Bowel sounds normal. No masses  Extremities: Extremities normal, atraumatic, No cyanosis. No edema. No clubbing  Skin:     Texture, turgor normal. No rashes or lesions. Not Jaundiced  Lymph nodes: Cervical, supraclavicular normal.  Psych:  Good insight. Not depressed. Not anxious or agitated. NEUROLOGICAL EXAM:  Appearance: The patient is well developed, well nourished, provides a coherent history and is in no acute distress. Mental Status: Oriented to time, place and person. Mood and affect appropriate. Cranial Nerves:   Intact visual fields. Fundi are benign. HILDA, EOM's full, no nystagmus, no ptosis. Facial sensation is normal. Corneal reflexes are intact. Facial movement is symmetric. Hearing is normal bilaterally. Palate is midline with normal sternocleidomastoid and trapezius muscles are normal. Tongue is midline. Motor:  5/5 strength in upper and lower proximal and distal muscles. Normal bulk and tone. No fasciculations. Reflexes:   Deep tendon reflexes 2+/4 and symmetrical.   Sensory:   Normal to touch, pinprick and vibration. Gait:  Normal gait. Tremor:   No tremor noted. Cerebellar:  No cerebellar signs present. Neurovascular:  Normal heart sounds and regular rhythm, peripheral pulses intact, and no carotid bruits. Assesment  1. Cerebral ischemia  Stable  Continue current management  2. Migraine without aura and without status migrainosus, not intractable  Continue Topamax and Nurtec    3. Seizure disorder ([facility]  Continue Topamax    ___________________________________________________  PLAN: Medication and plan discussed with patient      ICD-10-CM ICD-9-CM    1. Cerebral ischemia  I67.82 437.1       2. Migraine without aura and without status migrainosus, not intractable  G43.009 346.10       3.  Seizure disorder ([facility]  G40.909 345.90 ___________________________________________________    Attending Physician: MD Melissa Parada [facility]

## 2023-06-22 ENCOUNTER — TELEPHONE (OUTPATIENT)
Age: 48
End: 2023-06-22

## 2023-06-22 RX ORDER — TOPIRAMATE 100 MG/1
100 CAPSULE, EXTENDED RELEASE ORAL DAILY
Qty: 30 CAPSULE | Refills: 7 | Status: SHIPPED | OUTPATIENT
Start: 2023-06-22

## 2023-06-22 NOTE — TELEPHONE ENCOUNTER
Requested Prescriptions     Pending Prescriptions Disp Refills    topiramate ER (TROKENDI XR) 100 MG CP24 30 capsule 5     Sig: Take 100 mg by mouth daily    Last fill 06/02/2022   Last visit 09/27/22    Next visit 01/24/24

## 2023-06-22 NOTE — TELEPHONE ENCOUNTER
Patient called stating that she needs her Topiramate refilled. Pt is scheduled for next available (1/24) and wants to know if we could please refill this for her. Please advise.      [facility] on [address] ([phone])

## 2024-03-25 ENCOUNTER — CLINICAL DOCUMENTATION (OUTPATIENT)
Age: 49
End: 2024-03-25

## 2024-03-25 NOTE — PROGRESS NOTES
Received fax from [facility] pharmacy for refill on Topiramate. Faxed note back stating pt needs to be seen for further refills. Faxed to [facility] at [phone] and received fax confirmation.    Pt last seen by Radha on 9/27/22  Canceled appt 1/24/24 and no appt scheduled.

## 2025-01-30 ENCOUNTER — APPOINTMENT (OUTPATIENT)
Facility: HOSPITAL | Age: 50
End: 2025-01-30
Payer: MEDICAID

## 2025-01-30 ENCOUNTER — HOSPITAL ENCOUNTER (EMERGENCY)
Facility: HOSPITAL | Age: 50
Discharge: HOME | End: 2025-01-31
Attending: EMERGENCY MEDICINE
Payer: MEDICAID

## 2025-01-30 DIAGNOSIS — Z86.73 H/O ISCHEMIC LEFT MCA STROKE: ICD-10-CM

## 2025-01-30 DIAGNOSIS — S09.90XA CLOSED HEAD INJURY, INITIAL ENCOUNTER: ICD-10-CM

## 2025-01-30 DIAGNOSIS — S00.01XA ABRASION OF SCALP, INITIAL ENCOUNTER: ICD-10-CM

## 2025-01-30 DIAGNOSIS — S09.8XXA BLUNT HEAD INJURY, INITIAL ENCOUNTER: Primary | ICD-10-CM

## 2025-01-30 PROCEDURE — 6370000000 HC RX 637 (ALT 250 FOR IP): Performed by: EMERGENCY MEDICINE

## 2025-01-30 PROCEDURE — 70450 CT HEAD/BRAIN W/O DYE: CPT

## 2025-01-30 PROCEDURE — 99284 EMERGENCY DEPT VISIT MOD MDM: CPT

## 2025-01-30 PROCEDURE — 99281 EMR DPT VST MAYX REQ PHY/QHP: CPT

## 2025-01-30 PROCEDURE — 72125 CT NECK SPINE W/O DYE: CPT

## 2025-01-30 PROCEDURE — 4500000002 HC ER NO CHARGE

## 2025-01-30 RX ORDER — ACETAMINOPHEN 500 MG
1000 TABLET ORAL
Status: COMPLETED | OUTPATIENT
Start: 2025-01-30 | End: 2025-01-30

## 2025-01-30 RX ORDER — DIAZEPAM 5 MG/1
5 TABLET ORAL ONCE
Status: COMPLETED | OUTPATIENT
Start: 2025-01-30 | End: 2025-01-30

## 2025-01-30 RX ADMIN — ACETAMINOPHEN 1000 MG: 500 TABLET, FILM COATED ORAL at 22:37

## 2025-01-30 RX ADMIN — DIAZEPAM 5 MG: 5 TABLET ORAL at 23:56

## 2025-01-30 RX ADMIN — Medication 3 ML: at 23:46

## 2025-01-30 ASSESSMENT — PAIN - FUNCTIONAL ASSESSMENT
PAIN_FUNCTIONAL_ASSESSMENT: 0-10
PAIN_FUNCTIONAL_ASSESSMENT: PREVENTS OR INTERFERES SOME ACTIVE ACTIVITIES AND ADLS

## 2025-01-30 ASSESSMENT — PAIN DESCRIPTION - DIRECTION: RADIATING_TOWARDS: NECK

## 2025-01-30 ASSESSMENT — PAIN DESCRIPTION - LOCATION: LOCATION: HEAD

## 2025-01-30 ASSESSMENT — PAIN DESCRIPTION - FREQUENCY: FREQUENCY: CONTINUOUS

## 2025-01-30 ASSESSMENT — PAIN DESCRIPTION - ORIENTATION: ORIENTATION: ANTERIOR;LEFT

## 2025-01-30 ASSESSMENT — PAIN SCALES - GENERAL
PAINLEVEL_OUTOF10: 6
PAINLEVEL_OUTOF10: 8
PAINLEVEL_OUTOF10: 4

## 2025-01-30 ASSESSMENT — PAIN DESCRIPTION - ONSET: ONSET: SUDDEN

## 2025-01-30 ASSESSMENT — PAIN DESCRIPTION - DESCRIPTORS: DESCRIPTORS: PINS AND NEEDLES

## 2025-01-30 ASSESSMENT — PAIN DESCRIPTION - PAIN TYPE: TYPE: ACUTE PAIN

## 2025-01-31 VITALS
RESPIRATION RATE: 16 BRPM | HEIGHT: 62 IN | TEMPERATURE: 98.7 F | HEART RATE: 88 BPM | SYSTOLIC BLOOD PRESSURE: 162 MMHG | DIASTOLIC BLOOD PRESSURE: 103 MMHG | BODY MASS INDEX: 29.44 KG/M2 | OXYGEN SATURATION: 96 % | WEIGHT: 160 LBS

## 2025-01-31 RX ORDER — BUTALBITAL, ACETAMINOPHEN AND CAFFEINE 50; 325; 40 MG/1; MG/1; MG/1
1 TABLET ORAL EVERY 6 HOURS PRN
Qty: 12 TABLET | Refills: 3 | Status: SHIPPED | OUTPATIENT
Start: 2025-01-31

## 2025-01-31 ASSESSMENT — ENCOUNTER SYMPTOMS
NAUSEA: 0
VOMITING: 0
DIARRHEA: 0
SHORTNESS OF BREATH: 0
ABDOMINAL PAIN: 0

## 2025-01-31 ASSESSMENT — PAIN SCALES - GENERAL: PAINLEVEL_OUTOF10: 1
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
## 2025-01-31 NOTE — ED NOTES
Contacted [facility] at this time regarding patient's chief complaint and event prior to ED visit.
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
## 2025-01-31 NOTE — DISCHARGE INSTRUCTIONS
It was a pleasure taking care of you in our Emergency Department today.  We know that when you come to [facility], you are entrusting us with your health, comfort, and safety.  Our physicians and nurses honor that trust, and truly appreciate the opportunity to care for you and your loved ones.      We also value your feedback.  If you receive a survey about your Emergency Department experience today, please fill it out.  We care about our patients' feedback, and we listen to what you have to say.  Thank you!      Dr. Odilia Beyer MD.

## 2025-03-05 ENCOUNTER — OFFICE VISIT (OUTPATIENT)
Age: 50
End: 2025-03-05

## 2025-03-05 VITALS
BODY MASS INDEX: 31.83 KG/M2 | RESPIRATION RATE: 16 BRPM | WEIGHT: 174 LBS | TEMPERATURE: 102 F | HEART RATE: 108 BPM | SYSTOLIC BLOOD PRESSURE: 144 MMHG | OXYGEN SATURATION: 97 % | DIASTOLIC BLOOD PRESSURE: 98 MMHG

## 2025-03-05 DIAGNOSIS — J06.9 VIRAL UPPER RESPIRATORY TRACT INFECTION WITH COUGH: Primary | ICD-10-CM

## 2025-03-05 LAB
INFLUENZA A ANTIGEN, POC: NORMAL
INFLUENZA B ANTIGEN, POC: NORMAL
Lab: NORMAL
PERFORMING INSTRUMENT: NORMAL
QC PASS/FAIL: NORMAL
SARS-COV-2, POC: NORMAL

## 2025-03-05 RX ORDER — ACETAMINOPHEN 500 MG
500 TABLET ORAL ONCE
Status: COMPLETED | OUTPATIENT
Start: 2025-03-05 | End: 2025-03-05

## 2025-03-05 RX ADMIN — Medication 500 MG: at 14:41

## 2025-03-05 NOTE — PROGRESS NOTES
3/5/2025   Donita Reyes (: 1975) is a 49 y.o. female, New patient, here for evaluation of the following chief complaint(s):  Cold Symptoms (X , cough, fever, chills, headache, yellow mucus)     ASSESSMENT/PLAN:  Below is the assessment and plan developed based on review of pertinent history, physical exam, labs, studies, and medications.  Assessment & Plan  Viral upper respiratory tract infection with cough       Orders:    acetaminophen (TYLENOL) tablet 500 mg    POCT Influenza A/B Antigen    POCT COVID-19, Antigen  Exam and history consistent with viral upper respiratory infection with cough.  -COVID test was negative, flu test was negative   -Increase fluid intake to maintain hydration and to help fight infection. Please drink at least 64 ounces of fluid a day  -Ibuprofen 600 mg every 6 hours as needed and Tylenol 500 mg every 6 hours as needed for fever/pain  -Mucinex Fastmax, Tylenol severe cold and flu, or DayQuil for symptomatic relief and decongestion  -1 tablespoon of honey or mixed with hot tea for help with cough.  Recommend taking 30 minutes before sleep to help with cough at night  -Steam inhalation, warm compresses, humidifier, and warm soup can also help  -Please follow-up with your PCP in the next 2 to 4 weeks    If symptoms persist or worsen, please contact PCP and/or return to urgent care.       Handout given with care instructions  2. OTC for symptom management. Increase fluid intake, ensure adequate nutritional intake.  3. Follow up with PCP as needed.  4. Go to ED with development of any acute symptoms.     Follow up:  No follow-ups on file.  Follow up immediately for any new, worsening or changes or if symptoms are not improving over the next 5-7 days.     SUBJECTIVE/OBJECTIVE:  HPI      Cold Symptoms        Ms Reyes presents with concern for cough, fever, chills, headache, and yellow mucous since . Otherwise, she denies any shortness of breath, chest pain, nausea,

## 2025-03-05 NOTE — PATIENT INSTRUCTIONS
Exam and history consistent with viral upper respiratory infection with cough.  -COVID test was negative, flu test was negative  -Increase fluid intake to maintain hydration and to help fight infection. Please drink at least 64 ounces of fluid a day  -Ibuprofen 600 mg every 6 hours as needed and Tylenol 500 mg every 6 hours as needed for fever/pain  -Mucinex Fastmax, Tylenol severe cold and flu, or DayQuil for symptomatic relief and decongestion  -1 tablespoon of honey or mixed with hot tea for help with cough.  Recommend taking 30 minutes before sleep to help with cough at night  -Steam inhalation, warm compresses, humidifier, and warm soup can also help  -Please follow-up with your PCP in the next 2 to 4 weeks    If symptoms persist or worsen, please contact PCP and/or return to urgent care.